# Patient Record
Sex: FEMALE | Race: WHITE | NOT HISPANIC OR LATINO | Employment: UNEMPLOYED | URBAN - METROPOLITAN AREA
[De-identification: names, ages, dates, MRNs, and addresses within clinical notes are randomized per-mention and may not be internally consistent; named-entity substitution may affect disease eponyms.]

---

## 2023-11-01 ENCOUNTER — OFFICE VISIT (OUTPATIENT)
Dept: INTERNAL MEDICINE | Facility: CLINIC | Age: 23
End: 2023-11-01
Payer: OTHER GOVERNMENT

## 2023-11-01 VITALS
BODY MASS INDEX: 21.49 KG/M2 | DIASTOLIC BLOOD PRESSURE: 82 MMHG | HEIGHT: 65 IN | WEIGHT: 129 LBS | TEMPERATURE: 97.3 F | HEART RATE: 68 BPM | SYSTOLIC BLOOD PRESSURE: 100 MMHG

## 2023-11-01 DIAGNOSIS — Z00.00 WELL ADULT EXAM: Primary | ICD-10-CM

## 2023-11-01 PROCEDURE — 99385 PREV VISIT NEW AGE 18-39: CPT | Performed by: STUDENT IN AN ORGANIZED HEALTH CARE EDUCATION/TRAINING PROGRAM

## 2023-11-01 NOTE — PROGRESS NOTES
"Chief Complaint  Aimee Stevens is a 23 y.o. female presenting for Annual Exam ([facility] ).     From [place]. ,  is in the [employer] and will be moving to [place] in the fall of 2024. She is in graduate school for occupational therapy, graduating in the spring of 2024.    No previous medical history.    History of Present Illness  Patient is here to [facility] and for annual physical.    He is then overall good health.  She has not had a PCP since he was under pediatricians care.  She has no significant past medical history, no history of allergies/seasonal allergies, asthma, chronic lung disease, heart condition, liver or kidney issues.  No chronic infections.  She has not been admitted to the hospital or been under specialty care for any condition.  She is not on any medications.    She is , they are sexually active and he uses condoms consistently.  Patient was never on birth control, and they have no plans for conception over the next few years.    She is physically active, exercises 2-3 days weekly.  She does not use tobacco, alcohol or any form of drugs.    She did see OB/GYN at [facility] and had her first Pap smear around age 21, which came back normal.  She is not sure if she had the HPV vaccine.  She does go to the dentist annually.    The following portions of the patient's history were reviewed and updated as appropriate: allergies, current medications, past family history, past medical history, past social history, past surgical history, and problem list.    Objective  /82 (BP Location: Left arm, Patient Position: Sitting, Cuff Size: Adult)   Pulse 68   Temp 97.3 °F (36.3 °C) (Temporal)   Ht 166 cm (65.35\")   Wt 58.5 kg (129 lb)   BMI 21.23 kg/m²     Physical Exam  Vitals reviewed.   Constitutional:       Appearance: Normal appearance. She is not ill-appearing.   HENT:      Head: Normocephalic and atraumatic.      Right Ear: Tympanic membrane, ear canal and " external ear normal. There is no impacted cerumen.      Left Ear: Tympanic membrane, ear canal and external ear normal. There is no impacted cerumen.      Nose: Nose normal. No congestion.      Mouth/Throat:      Mouth: Mucous membranes are moist.   Eyes:      Extraocular Movements: Extraocular movements intact.      Conjunctiva/sclera: Conjunctivae normal.   Cardiovascular:      Rate and Rhythm: Normal rate and regular rhythm.      Heart sounds: Normal heart sounds. No murmur heard.  Pulmonary:      Effort: Pulmonary effort is normal.      Breath sounds: Normal breath sounds.   Abdominal:      General: There is no distension.      Palpations: Abdomen is soft. There is no mass.      Tenderness: There is no abdominal tenderness.   Musculoskeletal:      Cervical back: Neck supple. No tenderness.      Right lower leg: No edema.      Left lower leg: No edema.   Lymphadenopathy:      Cervical: No cervical adenopathy.   Skin:     General: Skin is warm and dry.   Neurological:      Mental Status: She is alert and oriented to person, place, and time. Mental status is at baseline.   Psychiatric:         Behavior: Behavior normal.         Thought Content: Thought content normal.         Assessment/Plan   1. Well adult exam  Patient in great physical health.  She has brought immunization records, we will update her chart.  She is not sure if she had the HPV vaccine/Gardasil for human papilloma virus, which can prevent cervical cancer.  She will check her records, and if she would like we can give it to her here in the clinic.  She can get this administered by nursing staff, this would be 3 doses.  She will also check her vaccine records for tetanus/Tdap, which is recommended every 10 years.  Typically the last dose is given around age 11-12, so she might be due.  She can also get this vaccine here at her convenience.  She already has gotten the flu vaccine for this year, she also had 2 COVID vaccines.  Counseled on  recommendations for repeat COVID booster.  We do not have it available in the office, but she can get it at the pharmacy.  Counseled on recommendations for moderate intensity exercise 2.5 hours weekly.  Counseled for recommendations for regular dental visits, at least annually, ideally twice a year.  Counseled on recommendations for cervical cancer screening/Pap smear every 3 years until age 30, as long as the Pap smears are normal.  We did discuss screening blood work for liver, kidneys, blood count, cholesterol, STI testing.  She is in good health and low risk and declines at this time.    Counseled on safe driving including wearing seatbelt, avoiding texting and talking on the phone while driving    BMI is within normal parameters. No other follow-up for BMI required.      Return if symptoms worsen or fail to improve.    Sean Lopez MD  Family Medicine  11/01/2023